# Patient Record
Sex: FEMALE | Race: WHITE | NOT HISPANIC OR LATINO | Employment: FULL TIME | URBAN - METROPOLITAN AREA
[De-identification: names, ages, dates, MRNs, and addresses within clinical notes are randomized per-mention and may not be internally consistent; named-entity substitution may affect disease eponyms.]

---

## 2021-05-06 ENCOUNTER — PATIENT MESSAGE (OUTPATIENT)
Dept: RESEARCH | Facility: HOSPITAL | Age: 56
End: 2021-05-06

## 2022-09-14 ENCOUNTER — OFFICE VISIT (OUTPATIENT)
Dept: PODIATRY | Facility: CLINIC | Age: 57
End: 2022-09-14
Payer: COMMERCIAL

## 2022-09-14 VITALS — BODY MASS INDEX: 24.66 KG/M2 | WEIGHT: 134 LBS | HEIGHT: 62 IN

## 2022-09-14 DIAGNOSIS — M62.461 GASTROCNEMIUS EQUINUS, RIGHT: ICD-10-CM

## 2022-09-14 DIAGNOSIS — M72.2 PLANTAR FASCIITIS: Primary | ICD-10-CM

## 2022-09-14 PROCEDURE — 1159F MED LIST DOCD IN RCRD: CPT | Mod: CPTII,S$GLB,, | Performed by: PODIATRIST

## 2022-09-14 PROCEDURE — 1159F PR MEDICATION LIST DOCUMENTED IN MEDICAL RECORD: ICD-10-PCS | Mod: CPTII,S$GLB,, | Performed by: PODIATRIST

## 2022-09-14 PROCEDURE — 99204 OFFICE O/P NEW MOD 45 MIN: CPT | Mod: S$GLB,,, | Performed by: PODIATRIST

## 2022-09-14 PROCEDURE — 99999 PR PBB SHADOW E&M-EST. PATIENT-LVL III: CPT | Mod: PBBFAC,,, | Performed by: PODIATRIST

## 2022-09-14 PROCEDURE — 3008F PR BODY MASS INDEX (BMI) DOCUMENTED: ICD-10-PCS | Mod: CPTII,S$GLB,, | Performed by: PODIATRIST

## 2022-09-14 PROCEDURE — 99204 PR OFFICE/OUTPT VISIT, NEW, LEVL IV, 45-59 MIN: ICD-10-PCS | Mod: S$GLB,,, | Performed by: PODIATRIST

## 2022-09-14 PROCEDURE — 3008F BODY MASS INDEX DOCD: CPT | Mod: CPTII,S$GLB,, | Performed by: PODIATRIST

## 2022-09-14 PROCEDURE — 99999 PR PBB SHADOW E&M-EST. PATIENT-LVL III: ICD-10-PCS | Mod: PBBFAC,,, | Performed by: PODIATRIST

## 2022-09-14 NOTE — PATIENT INSTRUCTIONS
- Perform stretching exercises, see handout for details, to address tightness of calf muscles.      - Recommend wearing supportive shoes only.  Avoid barefoot walking, flip flops, and Crocs, as this will exacerbate current symptoms.      - Recommend icing the affected heel a minimum of 20 minutes daily.    - Recommend taking either Tylenol or Ibuprofen to address pain.  The appropriate medication was recommended with today's visit.      - Avoid high impact activities such as squatting, stooping, and running as these activities will exacerbate symptoms.      - May consider applying a topical analgesic (Voltaren cream also known as diclofenac) to help with pain symptoms.

## 2022-09-15 NOTE — PROGRESS NOTES
Subjective:      Patient ID: Kamini Dolan is a 57 y.o. female.    Chief Complaint: No chief complaint on file.    Kamini is a 57 y.o. female who presents to the clinic complaining of Rt. Heel pain that has been present for months.  Describes pain as sharp and rates as a 2/10 while at rest.  Symptoms are aggravated with weight bearing after periods of rest.  States symptoms were initially noted after moving her daughter from her third floor apartment with continuous ascending and descending the stairs.  Symptoms are now beginning to impede her activities of daily living.  Notes she would like to be active but is unable.  Inquires as to how this can be resolved.  Denies any additional pedal complaints.      Past Medical History:   Diagnosis Date    Acquired hypothyroidism 2016    Sciatica neuralgia 2016    Seasonal allergic rhinitis 2012    Sinusitis, chronic 2012       No past surgical history on file.    Family History   Problem Relation Age of Onset    Breast cancer Mother 61       Social History     Socioeconomic History    Marital status:    Tobacco Use    Smoking status: Former     Types: Cigarettes     Quit date: 1987     Years since quittin.8   Substance and Sexual Activity    Alcohol use: Yes     Alcohol/week: 0.0 standard drinks       Current Outpatient Medications   Medication Sig Dispense Refill    ACZONE 5 % topical gel       azelastine (ASTELIN) 137 mcg (0.1 %) nasal spray 1 SPRAY (137 MCG TOTAL) BY NASAL ROUTE 2 (TWO) TIMES DAILY. 30 mL 5    fluticasone (FLONASE) 50 mcg/actuation nasal spray 2 SPRAYS IN EACH NOSTRIL EVERY DAY 1 Bottle 5    levocetirizine (XYZAL) 5 MG tablet Take 1 tablet (5 mg total) by mouth once daily. 30 tablet 11    montelukast (SINGULAIR) 10 mg tablet Take 1 tablet (10 mg total) by mouth once daily. 30 tablet 5    azithromycin (Z-MARQUIS) 250 MG tablet Take 1 tablet (250 mg total) by mouth once daily. Take first 2 tablets together,  then 1 every day until finished. 6 tablet 0    ibuprofen (ADVIL,MOTRIN) 600 MG tablet Take 1 tablet (600 mg total) by mouth every 6 (six) hours as needed for Pain. 20 tablet 0    levothyroxine (SYNTHROID) 25 MCG tablet Take 1 - 2 po QD as directed 60 tablet 5    LOSEASONIQUE 0.10 mg-20 mcg (84)/10 mcg (7) 3MPk       meloxicam (MOBIC) 15 MG tablet Take 1 tablet (15 mg total) by mouth daily as needed. 30 tablet 5    naproxen (NAPROSYN) 500 MG tablet Take 1 tablet (500 mg total) by mouth 2 (two) times daily with meals. PRN pain, take with food 14 tablet 0     No current facility-administered medications for this visit.       Review of patient's allergies indicates:   Allergen Reactions    Doxycycline Nausea Only    Morphine Nausea And Vomiting    Penicillins       Review of Systems   Constitutional: Negative for chills and fever.   Cardiovascular:  Negative for claudication and leg swelling.   Skin:  Negative for color change and nail changes.   Musculoskeletal:  Positive for myalgias. Negative for joint pain, joint swelling, muscle cramps and muscle weakness.   Gastrointestinal:  Negative for nausea and vomiting.   Neurological:  Negative for numbness and paresthesias.   Psychiatric/Behavioral:  Negative for altered mental status.          Objective:      Physical Exam  Constitutional:       Appearance: Normal appearance. She is not ill-appearing.   Cardiovascular:      Pulses:           Dorsalis pedis pulses are 2+ on the right side and 2+ on the left side.        Posterior tibial pulses are 2+ on the right side and 2+ on the left side.      Comments: CFT is < 3 seconds bilateral.  Pedal hair growth is present bilateral.  No lower extremity edema noted bilateral.  Toes are warm to touch bilateral.    Musculoskeletal:         General: Tenderness present. No swelling or signs of injury.      Right lower leg: No edema.      Left lower leg: No edema.      Comments: Muscle strength 5/5 in all muscle groups bilateral.  No  tenderness nor crepitation with ROM of foot/ankle joints bilateral.   Pain with palpation to the Rt. Medial calcaneal tubercle.  Rt. Sided gastrocnemius equinus.   Skin:     General: Skin is warm and dry.      Capillary Refill: Capillary refill takes 2 to 3 seconds.      Findings: No bruising, ecchymosis, erythema, signs of injury, laceration, lesion, petechiae, rash or wound.      Comments: Pedal skin has normal turgor, temperature, and texture bilateral.  Toenails x 10 appear normotrophic. Examination of the skin reveals no evidence of significant maceration, rashes, open lesions, suspicious appearing nevi or other concerning lesions.       Neurological:      General: No focal deficit present.      Mental Status: She is alert.      Sensory: No sensory deficit.      Motor: No weakness or atrophy.      Comments: Light touch is intact bilateral.             Assessment:       Encounter Diagnoses   Name Primary?    Plantar fasciitis - Right Foot Yes    Gastrocnemius equinus, right          Plan:       Diagnoses and all orders for this visit:    Plantar fasciitis - Right Foot  -     Ambulatory referral/consult to Physical/Occupational Therapy; Future    Gastrocnemius equinus, right  -     Ambulatory referral/consult to Physical/Occupational Therapy; Future      I counseled the patient on her conditions, their implications and medical management.       - Discussed performing stretching exercises to address bilateral equinus.     - Recommend wearing supportive shoes only.  Discussed avoidance of barefoot walking, flip flops, and Crocs, as this will exacerbate current symptoms.      - Recommend icing the affected heel a minimum of 20 minutes daily.     - Recommend taking a nsaid to address pain/inflammation.    - Discussed avoidance of high impact activities such as squatting, stooping, and running as these activities will exacerbate symptoms.       - May consider applying a topical analgesic (Voltaren cream) to help with  pain symptoms.       - RTC prn or sooner if symptoms fail to resolve within 6 weeks.  Will consider corticosteroid injection at that time.     Phil Gaspar DPM

## 2022-10-05 ENCOUNTER — PATIENT MESSAGE (OUTPATIENT)
Dept: PODIATRY | Facility: CLINIC | Age: 57
End: 2022-10-05
Payer: COMMERCIAL

## 2022-10-07 ENCOUNTER — PATIENT MESSAGE (OUTPATIENT)
Dept: PODIATRY | Facility: CLINIC | Age: 57
End: 2022-10-07
Payer: COMMERCIAL

## 2022-10-14 ENCOUNTER — PATIENT MESSAGE (OUTPATIENT)
Dept: PODIATRY | Facility: CLINIC | Age: 57
End: 2022-10-14
Payer: COMMERCIAL

## 2022-10-17 ENCOUNTER — OFFICE VISIT (OUTPATIENT)
Dept: PODIATRY | Facility: CLINIC | Age: 57
End: 2022-10-17
Payer: COMMERCIAL

## 2022-10-17 DIAGNOSIS — M72.2 PLANTAR FASCIITIS: Primary | ICD-10-CM

## 2022-10-17 DIAGNOSIS — M62.461 GASTROCNEMIUS EQUINUS, RIGHT: ICD-10-CM

## 2022-10-17 PROCEDURE — 99499 NO LOS: ICD-10-PCS | Mod: S$GLB,,, | Performed by: PODIATRIST

## 2022-10-17 PROCEDURE — 99999 PR PBB SHADOW E&M-EST. PATIENT-LVL II: ICD-10-PCS | Mod: PBBFAC,,, | Performed by: PODIATRIST

## 2022-10-17 PROCEDURE — 20550 PR INJECT TENDON SHEATH/LIGAMENT: ICD-10-PCS | Mod: RT,S$GLB,, | Performed by: PODIATRIST

## 2022-10-17 PROCEDURE — 20550 NJX 1 TENDON SHEATH/LIGAMENT: CPT | Mod: RT,S$GLB,, | Performed by: PODIATRIST

## 2022-10-17 PROCEDURE — 99499 UNLISTED E&M SERVICE: CPT | Mod: S$GLB,,, | Performed by: PODIATRIST

## 2022-10-17 PROCEDURE — 1159F MED LIST DOCD IN RCRD: CPT | Mod: CPTII,S$GLB,, | Performed by: PODIATRIST

## 2022-10-17 PROCEDURE — 1159F PR MEDICATION LIST DOCUMENTED IN MEDICAL RECORD: ICD-10-PCS | Mod: CPTII,S$GLB,, | Performed by: PODIATRIST

## 2022-10-17 PROCEDURE — 99999 PR PBB SHADOW E&M-EST. PATIENT-LVL II: CPT | Mod: PBBFAC,,, | Performed by: PODIATRIST

## 2022-10-17 RX ORDER — DEXAMETHASONE SODIUM PHOSPHATE 4 MG/ML
2 INJECTION, SOLUTION INTRA-ARTICULAR; INTRALESIONAL; INTRAMUSCULAR; INTRAVENOUS; SOFT TISSUE
Status: COMPLETED | OUTPATIENT
Start: 2022-10-17 | End: 2022-10-17

## 2022-10-17 RX ORDER — LIDOCAINE HYDROCHLORIDE 10 MG/ML
1 INJECTION INFILTRATION; PERINEURAL
Status: COMPLETED | OUTPATIENT
Start: 2022-10-17 | End: 2022-10-17

## 2022-10-17 RX ORDER — TRIAMCINOLONE ACETONIDE 40 MG/ML
20 INJECTION, SUSPENSION INTRA-ARTICULAR; INTRAMUSCULAR ONCE
Status: COMPLETED | OUTPATIENT
Start: 2022-10-17 | End: 2022-10-17

## 2022-10-17 RX ADMIN — TRIAMCINOLONE ACETONIDE 20 MG: 40 INJECTION, SUSPENSION INTRA-ARTICULAR; INTRAMUSCULAR at 02:10

## 2022-10-17 RX ADMIN — LIDOCAINE HYDROCHLORIDE 1 ML: 10 INJECTION INFILTRATION; PERINEURAL at 02:10

## 2022-10-17 RX ADMIN — DEXAMETHASONE SODIUM PHOSPHATE 2 MG: 4 INJECTION, SOLUTION INTRA-ARTICULAR; INTRALESIONAL; INTRAMUSCULAR; INTRAVENOUS; SOFT TISSUE at 02:10

## 2022-10-17 NOTE — PROGRESS NOTES
Subjective:      Patient ID: Kamini Dolan is a 57 y.o. female.    Chief Complaint: Heel Pain (rt)    Patient presents to clinic for a follow up regarding Rt. Sided plantar fasciitis.  Describes pain in the heel as sharp and rates currently as a 3/10.  Symptoms are aggravated with prolonged weight bearing activity and alleviated partially with rest.  She has been stretching and wearing supportive shoe gear, as instructed, with moderate improvement noted in symptoms.  Requests an injection with today's exam.  Denies any additional pedal complaints.      Past Medical History:   Diagnosis Date    Acquired hypothyroidism 2016    Sciatica neuralgia 2016    Seasonal allergic rhinitis 2012    Sinusitis, chronic 2012       No past surgical history on file.    Family History   Problem Relation Age of Onset    Breast cancer Mother 61       Social History     Socioeconomic History    Marital status:    Tobacco Use    Smoking status: Former     Types: Cigarettes     Quit date: 1987     Years since quittin.9   Substance and Sexual Activity    Alcohol use: Yes     Alcohol/week: 0.0 standard drinks       Current Outpatient Medications   Medication Sig Dispense Refill    ACZONE 5 % topical gel       azelastine (ASTELIN) 137 mcg (0.1 %) nasal spray 1 SPRAY (137 MCG TOTAL) BY NASAL ROUTE 2 (TWO) TIMES DAILY. 30 mL 5    azithromycin (Z-MARQUIS) 250 MG tablet Take 1 tablet (250 mg total) by mouth once daily. Take first 2 tablets together, then 1 every day until finished. 6 tablet 0    fluticasone (FLONASE) 50 mcg/actuation nasal spray 2 SPRAYS IN EACH NOSTRIL EVERY DAY 1 Bottle 5    ibuprofen (ADVIL,MOTRIN) 600 MG tablet Take 1 tablet (600 mg total) by mouth every 6 (six) hours as needed for Pain. 20 tablet 0    levocetirizine (XYZAL) 5 MG tablet Take 1 tablet (5 mg total) by mouth once daily. 30 tablet 11    levothyroxine (SYNTHROID) 25 MCG tablet Take 1 - 2 po QD as directed 60 tablet 5     LOSEASONIQUE 0.10 mg-20 mcg (84)/10 mcg (7) 3MPk       meloxicam (MOBIC) 15 MG tablet Take 1 tablet (15 mg total) by mouth daily as needed. 30 tablet 5    montelukast (SINGULAIR) 10 mg tablet Take 1 tablet (10 mg total) by mouth once daily. 30 tablet 5    naproxen (NAPROSYN) 500 MG tablet Take 1 tablet (500 mg total) by mouth 2 (two) times daily with meals. PRN pain, take with food 14 tablet 0     Current Facility-Administered Medications   Medication Dose Route Frequency Provider Last Rate Last Admin    dexAMETHasone injection 2 mg  2 mg Other 1 time in Clinic/HOD Phil Gaspar DPM        LIDOcaine HCL 10 mg/ml (1%) injection 1 mL  1 mL Other 1 time in Clinic/HOD Phil Gaspar DPM        triamcinolone acetonide injection 20 mg  20 mg Other Once Phil Gaspar DPM           Review of patient's allergies indicates:   Allergen Reactions    Doxycycline Nausea Only    Morphine Nausea And Vomiting    Penicillins       Review of Systems   Constitutional: Negative for chills and fever.   Cardiovascular:  Negative for claudication and leg swelling.   Skin:  Negative for color change and nail changes.   Musculoskeletal:  Positive for myalgias. Negative for joint pain, joint swelling, muscle cramps and muscle weakness.   Gastrointestinal:  Negative for nausea and vomiting.   Neurological:  Negative for numbness and paresthesias.   Psychiatric/Behavioral:  Negative for altered mental status.          Objective:      Physical Exam  Constitutional:       Appearance: Normal appearance. She is not ill-appearing.   Cardiovascular:      Pulses:           Dorsalis pedis pulses are 2+ on the right side and 2+ on the left side.        Posterior tibial pulses are 2+ on the right side and 2+ on the left side.      Comments: CFT is < 3 seconds bilateral.  Pedal hair growth is present bilateral.  No lower extremity edema noted bilateral.  Toes are warm to touch bilateral.    Musculoskeletal:         General: Tenderness present. No  swelling or signs of injury.      Right lower leg: No edema.      Left lower leg: No edema.      Comments: Muscle strength 5/5 in all muscle groups bilateral.  No tenderness nor crepitation with ROM of foot/ankle joints bilateral.   Pain with palpation to the Rt. Medial calcaneal tubercle.  Rt. Sided gastrocnemius equinus.   Skin:     General: Skin is warm and dry.      Capillary Refill: Capillary refill takes 2 to 3 seconds.      Findings: No bruising, ecchymosis, erythema, signs of injury, laceration, lesion, petechiae, rash or wound.      Comments: Pedal skin has normal turgor, temperature, and texture bilateral.  Toenails x 10 appear normotrophic. Examination of the skin reveals no evidence of significant maceration, rashes, open lesions, suspicious appearing nevi or other concerning lesions.       Neurological:      General: No focal deficit present.      Mental Status: She is alert.      Sensory: No sensory deficit.      Motor: No weakness or atrophy.      Comments: Light touch is intact bilateral.             Assessment:       Encounter Diagnoses   Name Primary?    Plantar fasciitis - Right Foot Yes    Gastrocnemius equinus, right          Plan:       Kamini was seen today for heel pain.    Diagnoses and all orders for this visit:    Plantar fasciitis - Right Foot  -     LIDOcaine HCL 10 mg/ml (1%) injection 1 mL  -     dexAMETHasone injection 2 mg  -     triamcinolone acetonide injection 20 mg    Gastrocnemius equinus, right    I counseled the patient on her conditions, their implications and medical management.    - After sterilizing the area with an alcohol prep and applying ethyl chloride, the affected area of the Rt. Medial heel was injected as per MAR.  The patient tolerated the injection well, with minimal blood loss noted from the injection site.  The injection site was then covered with a bandage.  Patient tolerated this quite well.         - To continue with the current regimen of stretching,  wearing appropriate shoe gear, and avoidance of high impact activities.       - RTC prn or sooner if symptoms fail to improve.  Will consider PT going forward.     Phil Gaspar DPM

## 2022-10-18 ENCOUNTER — PATIENT MESSAGE (OUTPATIENT)
Dept: PODIATRY | Facility: CLINIC | Age: 57
End: 2022-10-18
Payer: COMMERCIAL

## 2022-10-18 ENCOUNTER — TELEPHONE (OUTPATIENT)
Dept: FAMILY MEDICINE | Facility: CLINIC | Age: 57
End: 2022-10-18
Payer: COMMERCIAL

## 2022-10-18 NOTE — TELEPHONE ENCOUNTER
----- Message from Verónica Barriga sent at 10/18/2022  1:52 PM CDT -----  Type: Patient Call Back         Who called: Pt  Spouse          What is the request in detail: Pt called in regarding needs orders for a PCR test done with a QR code . Pt doesn't have a PCP Dr with [facility] . Can she still receive orders ?         Can the clinic reply by [facility]?no          Would the patient rather a call back or a response via [facility]? Call back          Best call back number:[phone]  or [phone] ROSALBA GREER Pt spouse          Additional Information:           Thank You

## 2022-10-23 ENCOUNTER — LAB VISIT (OUTPATIENT)
Dept: FAMILY MEDICINE | Facility: CLINIC | Age: 57
End: 2022-10-23
Payer: COMMERCIAL

## 2022-10-23 DIAGNOSIS — Z11.52 ENCOUNTER FOR SCREENING FOR SEVERE ACUTE RESPIRATORY SYNDROME CORONAVIRUS 2 (SARS-COV-2) INFECTION: Primary | ICD-10-CM

## 2022-10-23 LAB — SARS-COV-2 RNA RESP QL NAA+PROBE: NOT DETECTED

## 2022-10-23 PROCEDURE — U0003 INFECTIOUS AGENT DETECTION BY NUCLEIC ACID (DNA OR RNA); SEVERE ACUTE RESPIRATORY SYNDROME CORONAVIRUS 2 (SARS-COV-2) (CORONAVIRUS DISEASE [COVID-19]), AMPLIFIED PROBE TECHNIQUE, MAKING USE OF HIGH THROUGHPUT TECHNOLOGIES AS DESCRIBED BY CMS-2020-01-R: HCPCS | Performed by: FAMILY MEDICINE

## 2022-10-23 PROCEDURE — U0005 INFEC AGEN DETEC AMPLI PROBE: HCPCS | Performed by: FAMILY MEDICINE

## 2023-03-27 ENCOUNTER — DOCUMENTATION ONLY (OUTPATIENT)
Dept: ADMINISTRATIVE | Facility: OTHER | Age: 58
End: 2023-03-27
Payer: COMMERCIAL

## 2023-04-11 NOTE — PROGRESS NOTES
RADIATION ONCOLOGY CONSULTATION  SAMIRA BIRD [facility]      NAME: Kamini Dolan    : 1965 SEX: F MR#: [id number]   DIAGNOSIS: Right breast ductal carcinoma in situ   REFERRING PHYSICIAN: Lakeisha Kemp M.D.   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  The patient is being seen in consultation for consideration of radiotherapy at the request of Dr. Kemp.    HISTORY OF PRESENT ILLNESS:  The patient reports that for approximately the past five years she has undergone both annual MRIs and mammograms secondary to an increased Tyrer-Cuzick score of 20% to 40%.  Bilateral diagnostic mammograms on 2022, showed no evidence of malignancy.  Bilateral breast ultrasound on the same date showed a few benign-appearing anechoic cysts up to 3 mm in size.  There was a 7 mm intramammary lymph node at the 3 o'clock position without change, with the imaging read as BI-RADS 2.     On 2022, the patient underwent a bilateral breast MRI which showed a new approximate 1 cm segmentally-oriented plaque-like focus of clumped enhancement at the 11 to 12 o'clock position mid right breast with subadjacent glandular tissue.  There was no axillary or internal mammary lymphadenopathy, with the imaging read as BI-RADS 0.  On 2022, she underwent a follow up right diagnostic mammogram and ultrasound with no mammographic or ultrasound correlate noted on the imaging.  There was normal-appearing parenchyma with a few scattered benign-appearing calcifications without change seen.     On 2023, she underwent MRI-guided right breast core needle biopsy at the 11 to 12 o'clock  position.  Pathology demonstrated grade II ductal carcinoma in situ with the size not specified on the pathology report.  The specimen size was inadequate to perform estrogen receptor or progesterone receptor testing.  She was seen by Dr. Morris on 01/24/2023, who discussed the diagnosis and treatment options with plans made for breast conservation therapy at that time.  Genetic testing was ordered which was reportedly negative.     On 02/10/2023, she underwent a right breast AALIYAH  lumpectomy and sentinel lymph node biopsy by Dr. Morris.  The primary specimen measured 5.6 x 3.5 cm and demonstrated 4 x 4 mm of grade II ductal carcinoma in situ, cribriform pattern, without necrosis.  The margins were greater than 1 cm.  There were 0/1 positive lymph nodes.  The tumor was estrogen receptor negative and progesterone receptor negative.  She was staged as pTispN0.     She saw Dr. Morris on 02/28/2023, who felt that the patient was healing well postoperatively and noted possible consideration of omitting radiotherapy.  The patient was referred to Medical Oncology and Radiation Oncology.  She was seen by Dr. Kemp on 03/10/2023, at which time she discussed endocrine therapy, including chemoprevention with consideration of maybe tamoxifen.  The patient was referred to Radiation Oncology.    REVIEW OF SYSTEMS:  The patient denies any breast complaints, including masses, rashes, nipple discharge or axillary adenopathy.  She does note that there is a tissue defect superior to the nipple with the nipple elevated postoperatively, compared to the left side.  Denies any high fevers, shaking chills, drenching night sweats or recent weight loss.  She does report intermittent hot flashes, as well as foot, hip and back pain which is chronic in nature.     She denies any recent changes in vision, hearing or swallowing, shortness of breath at rest, dyspnea on exertion, cough, chest pain, abdominal pain, nausea, vomiting,  constipation, diarrhea, bright-red blood per rectum or urinary symptoms.  She denies any focal numbness, tingling, weakness, severe headaches, diplopia or ataxia.  All other systems reviewed and negative.    PAST MEDICAL HISTORY:  Ductal carcinoma in situ as above, hypothyroidism, seasonal allergies, plantar fasciitis, question of adrenal adenoma.    PAST SURGICAL HISTORY:  Status post right lumpectomy and sentinel lymph node biopsy as above.    PAST GYNECOLOGIC HISTORY:   with one ectopic pregnancy.  Menarche at the age of 12.  First live birth at the age of 36.  She  for greater than one year.  Menopause around the age of 55.  Oral contraceptive pill use for greater than 25 years.  Hormone replacement therapy, none.    PAST RADIATION THERAPY:  None.    MEDICATIONS:  Probiotic, multiple vitamin, flaxseed oil, famotidine, montelukast, azelastine, levocetirizine, multivitamin, melatonin, celecoxib.    ALLERGIES:  Morphine and Valium both cause nausea.    FAMILY HISTORY:  Mother with breast cancer diagnosed at the age of 62.  Maternal first cousin with breast cancer diagnosed at the age of 63.  No family history of ovarian malignancy.    SOCIAL HISTORY:  The patient does not smoke.  She reports drinking wine daily with dinner and denies illicit drug use.  She lives in [place], is  and has one daughter.  She worked in the past as an .        PHYSICAL EXAMINATION:  VITAL SIGNS:  Blood pressure 184/103, heart rate 91, temperature 98.3, height 5 feet 2 inches, weight 135 pounds, oxygen saturation 100% on room air.  ECOG score of 0.   GENERAL:  The patient is a pleasant well-nourished, well-developed  female in no acute distress.   HEENT:  Normocephalic, atraumatic.  Extraocular muscles intact.  Pupils equally round and reactive to light.  Sclerae anicteric.  Oral cavity and oropharynx are clear without erythema, exudate, masses or ulcerations.   NECK:  Supple without lymphadenopathy  or thyromegaly.   HEART:  Regular rate and rhythm.  Normal S1, S2.  No murmurs, gallops or rubs.     LUNGS:  Clear to auscultation bilaterally.  No wheezes, rhonchi or rales.   BACK:  No spinal or costovertebral angle tenderness.   ABDOMEN:  Soft, nontender and nondistended.  No masses or hepatosplenomegaly.   EXTREMITIES:  Warm and well perfused.  No clubbing, cyanosis or edema.   NEUROLOGIC:  Cranial nerves two through 12 grossly intact.  Motor and sensory intact bilaterally.  Finger-nose-finger without dysmetria with bilateral intention tremor.  2+ patellar deep tendon reflexes bilaterally.  No clonus.   BREASTS:  The left breast is soft and nontender without palpable masses or axillary lymphadenopathy.  The right breast demonstrates a well-healed 3 cm axillary scar and well-healed 2.5 cm horizontal lumpectomy scar at the 12 o'clock position with underlying postoperative induration.  There are no suspicious masses, rashes, nipple discharge, peau d'orange, skin retraction or axillary lymphadenopathy.    LABORATORIES:  None available.    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     Based upon this patient's history and presentation, I believe that she may be an appropriate candidate for external beam radiation therapy.  We discussed her presentation with early stage disease, namely pure DCIS, with excellent overall expected prognosis.  We reviewed her primary surgical treatment options being mastectomy versus breast conservation therapy consisting of lumpectomy followed by radiation with the overall survival being equivalent with either treatment modality.  The patient has undergone a lumpectomy with 4 mm of residual DCIS with wide surgical margins and a  negative sentinel lymph node biopsy, from which she appears to be healing well.     With regard to systemic therapy, the patient has met with Dr. Kemp of Medical Oncology.  She understands that there is no role for cytotoxic chemotherapy in the setting of in situ disease.  Her tumor stained negative for both estrogen receptors and progesterone receptors, and therefore there is no role for endocrine therapy to reduce the risk of recurrence of the excised tumor; however, there may be a role for possible chemoprevention with maybe tamoxifen to reduce the risk of future disease, which I encouraged her to further discuss with Dr. Kemp.     I explained to Ms. Dolan that the role of external beam radiation therapy is to reduce the risk of local and/or regional recurrence by approximately one-half to two-thirds in the setting of DCIS.  I would estimate her risk of local recurrence without radiation to be approximately 20%, although possibly as low as 15% given the overall favorable tumor characteristics of the patient's disease with the goal of treatment to reduce this risk to the under 5% to 7% to 8% range.  I do feel that she would be an appropriate candidate for a shortened course of hypofractionated external beam radiation therapy based upon recent randomized data from the [place] and [place] [facility] demonstrating equivalent treatment efficacy.  There is no role for treatment of the supraclavicular fossa in the absence of positive axillary lymph nodes, nor do I feel that an additional radiation boost is indicated to the tumor bed given her postmenopausal status, presentation of grade II disease and wide surgical margins.     We discussed the possibility of omitting radiation following lumpectomy, although this is typically not considered to be the standard of care as radiation has been demonstrated to decrease local recurrence risk in all patient populations.  She is likely at lower risk for local recurrence without radiation  given the minimal amount of in situ disease with wide surgical margins and it would therefore not be unreasonable to consider omitting radiation if she is opposed to it; however, I am concerned that given her ER and TX negative disease she will not benefit from endocrine therapy further reducing her local recurrence risk.      We discussed both the NCCN guidelines relating to postoperative radiation in the setting of DCIS, as well as RTOG 9804 in which local failure rates were low with observation following surgery, but significantly decreased with the addition of radiotherapy.  Ultimately, her decision with regard to radiation will depend upon whether she is more averse to postoperative radiotherapy, which I would anticipate her to tolerate well, or more averse to a slightly higher risk of recurrence without the addition of radiation.     The risks, benefits, side effects, alternatives to, indications for and mechanisms of external beam radiation therapy were explained in full to the patient.  She asked multiple appropriate questions, all of which were answered to her apparent satisfaction.  This conversation included a discussion of possible short-term side effects, including but not limited to dermatitis with skin tenderness, erythema, pruritus, possible desquamation, local hair loss and fatigue.      We also discussed the possible long-term side effects, including but not limited to residual hyperpigmentation, telangiectasias, the change in the shape, size and/or consistency of the breast, very low risk of ipsilateral rib fracture or trauma, very low risk of pneumonitis and/or pulmonary fibrosis, and less than 0.5% risk of secondary tumor formation over decades.  She was not at significantly increased risk for cardiac sequelae as this is a right-sided tumor.     At the conclusion of the consultation, Ms. Dolan indicated that she wished to further consider her options prior to making a final treatment decision,  which I encouraged her to do.  I provided her with a copy of my business card, and encouraged her to contact me should she have any questions and after she has made her final decision.  Should she opt for postoperative radiation, plans will be made for the patient to undergo CT-guided simulation in the supine treatment position with the use of a tilt board and Vac-Cathy for custom immobilization.  After a customized treatment plan has been designed, she would undergo verification films and initiate a course of hypofractionated forward-planned 3D conformal radiotherapy to the right breast.     Thank you very much, Dr. Kemp, for this consultation and the opportunity to participate in the care of this patient.  Please do not hesitate to contact me should you any questions, concerns and/or require additional information.      ADDENDUM:  Ms. Dolan has indicated that she would like to proceed with postoperative radiotherapy, and she has therefore been scheduled for CT-guided simulation with the plan to begin hypofractionated radiotherapy after her upcoming birthday, per the patient's request.        SANTA Trujillo MD

## 2023-06-15 ENCOUNTER — TELEPHONE (OUTPATIENT)
Dept: OBSTETRICS AND GYNECOLOGY | Facility: CLINIC | Age: 58
End: 2023-06-15
Payer: COMMERCIAL

## 2023-06-15 NOTE — TELEPHONE ENCOUNTER
----- Message from Rex Palmer sent at 6/15/2023 11:32 AM CDT -----  Contact: self  Type: Needs Medical Advice  Who Called: Patient   Best Call Back Number: [phone]  Additional Information: Pt states she will bautista to get her [facility] records sent to Dr. Lyon. Plz call and confirm how this can be done. Thanks

## 2023-06-16 ENCOUNTER — PATIENT MESSAGE (OUTPATIENT)
Dept: OBSTETRICS AND GYNECOLOGY | Facility: CLINIC | Age: 58
End: 2023-06-16
Payer: COMMERCIAL

## 2023-06-26 ENCOUNTER — PATIENT MESSAGE (OUTPATIENT)
Dept: OBSTETRICS AND GYNECOLOGY | Facility: CLINIC | Age: 58
End: 2023-06-26
Payer: COMMERCIAL

## 2023-08-15 ENCOUNTER — OFFICE VISIT (OUTPATIENT)
Dept: OBSTETRICS AND GYNECOLOGY | Facility: CLINIC | Age: 58
End: 2023-08-15
Payer: COMMERCIAL

## 2023-08-15 VITALS — BODY MASS INDEX: 26.13 KG/M2 | WEIGHT: 142 LBS | HEIGHT: 62 IN

## 2023-08-15 DIAGNOSIS — Z79.810 PROPHYLACTIC USE OF TAMOXIFEN: ICD-10-CM

## 2023-08-15 DIAGNOSIS — Z01.419 GYNECOLOGIC EXAM NORMAL: Primary | ICD-10-CM

## 2023-08-15 DIAGNOSIS — N32.81 OAB (OVERACTIVE BLADDER): ICD-10-CM

## 2023-08-15 DIAGNOSIS — D05.11 DUCTAL CARCINOMA IN SITU (DCIS) OF RIGHT BREAST: ICD-10-CM

## 2023-08-15 PROCEDURE — 99999 PR PBB SHADOW E&M-EST. PATIENT-LVL III: CPT | Mod: PBBFAC,,, | Performed by: OBSTETRICS & GYNECOLOGY

## 2023-08-15 PROCEDURE — 99386 PREV VISIT NEW AGE 40-64: CPT | Mod: S$GLB,,, | Performed by: OBSTETRICS & GYNECOLOGY

## 2023-08-15 PROCEDURE — 99999 PR PBB SHADOW E&M-EST. PATIENT-LVL III: ICD-10-PCS | Mod: PBBFAC,,, | Performed by: OBSTETRICS & GYNECOLOGY

## 2023-08-15 PROCEDURE — 88175 CYTOPATH C/V AUTO FLUID REDO: CPT | Performed by: OBSTETRICS & GYNECOLOGY

## 2023-08-15 PROCEDURE — 3008F PR BODY MASS INDEX (BMI) DOCUMENTED: ICD-10-PCS | Mod: CPTII,S$GLB,, | Performed by: OBSTETRICS & GYNECOLOGY

## 2023-08-15 PROCEDURE — 99386 PR PREVENTIVE VISIT,NEW,40-64: ICD-10-PCS | Mod: S$GLB,,, | Performed by: OBSTETRICS & GYNECOLOGY

## 2023-08-15 PROCEDURE — 87624 HPV HI-RISK TYP POOLED RSLT: CPT | Performed by: OBSTETRICS & GYNECOLOGY

## 2023-08-15 PROCEDURE — 3008F BODY MASS INDEX DOCD: CPT | Mod: CPTII,S$GLB,, | Performed by: OBSTETRICS & GYNECOLOGY

## 2023-08-15 RX ORDER — AMOXICILLIN 500 MG
CAPSULE ORAL DAILY
COMMUNITY

## 2023-08-15 RX ORDER — TRETINOIN 0.1 MG/G
GEL TOPICAL
COMMUNITY
Start: 2015-08-12

## 2023-08-15 RX ORDER — PROPYLENE GLYCOL 0.06 MG/ML
SOLUTION/ DROPS OPHTHALMIC
COMMUNITY
Start: 2020-08-12

## 2023-08-15 RX ORDER — CITRULL/ARGIN/PINE XT/ROSE HIP 400-400 MG
TABLET ORAL
COMMUNITY
Start: 2023-05-15

## 2023-08-15 RX ORDER — TAMOXIFEN CITRATE 10 MG/1
5 TABLET ORAL
COMMUNITY
Start: 2023-07-23

## 2023-08-15 RX ORDER — OLOPATADINE HYDROCHLORIDE 1 MG/ML
SOLUTION/ DROPS OPHTHALMIC
COMMUNITY
Start: 2021-08-12

## 2023-08-15 RX ORDER — LEVOTHYROXINE SODIUM 75 UG/1
75 TABLET ORAL
COMMUNITY
Start: 2023-06-29

## 2023-08-15 RX ORDER — FAMOTIDINE 20 MG/1
20 TABLET, FILM COATED ORAL 2 TIMES DAILY
COMMUNITY
Start: 2023-03-18

## 2023-08-15 RX ORDER — LANOLIN ALCOHOL/MO/W.PET/CERES
1 CREAM (GRAM) TOPICAL
COMMUNITY

## 2023-08-15 RX ORDER — EPINEPHRINE 0.3 MG/.3ML
INJECTION SUBCUTANEOUS
COMMUNITY
Start: 2023-03-24

## 2023-08-15 RX ORDER — CELECOXIB 100 MG/1
CAPSULE ORAL
COMMUNITY
Start: 2020-08-12

## 2023-08-15 RX ORDER — CHOLECALCIFEROL (VITAMIN D3) 25 MCG
1000 TABLET ORAL DAILY
COMMUNITY

## 2023-08-15 RX ORDER — CYANOCOBALAMIN (VITAMIN B-12) 500 MCG
TABLET ORAL NIGHTLY
COMMUNITY

## 2023-08-15 NOTE — PROGRESS NOTES
Chief Complaint   Patient presents with    [facility]    Well Woman       History of Present Illness: Kamini Dolan is a 58 y.o. female that presents today 8/15/2023 with Patient's last menstrual period was 06/13/2020.  for well gyn visit.    Past Medical History:   Diagnosis Date    Acquired hypothyroidism 04/24/2016    Breast cancer in female 2023    DCIS    Lesion of adrenal gland     Sciatica neuralgia 04/24/2016    Seasonal allergic rhinitis 11/26/2012    Sinusitis, chronic 11/26/2012       Past Surgical History:   Procedure Laterality Date    LUMPECTOMY, BREAST Right 2023       Outpatient Medications Prior to Visit   Medication Sig Dispense Refill    AA/prot/lysine/methio/vit C/B6 (A/G PRO ORAL) Take by mouth.      ACZONE 5 % topical gel       azelastine (ASTELIN) 137 mcg (0.1 %) nasal spray 1 SPRAY (137 MCG TOTAL) BY NASAL ROUTE 2 (TWO) TIMES DAILY. 30 mL 5    calcium carb,gluc/mag ox,gluc (CALCIUM MAGNESIUM ORAL) Take by mouth.      celecoxib (CELEBREX) 100 MG capsule       COLLAGEN MISC by Misc.(Non-Drug; Combo Route) route.      enzymes,digestive (DIGESTIVE ENZYMES ORAL) Take by mouth.      EPINEPHrine (EPIPEN) 0.3 mg/0.3 mL AtIn Inject into the muscle.      famotidine (PEPCID) 20 MG tablet Take 20 mg by mouth 2 (two) times daily.      ferrous sulfate (FEOSOL) Tab tablet Take 1 tablet by mouth daily with breakfast.      fluticasone (FLONASE) 50 mcg/actuation nasal spray 2 SPRAYS IN EACH NOSTRIL EVERY DAY 1 Bottle 5    L.acid/B.animalis,bifidum/FOS (PROBIOTIC COMPLEX ORAL) Take by mouth.      levocetirizine (XYZAL) 5 MG tablet Take 1 tablet (5 mg total) by mouth once daily. 30 tablet 11    melatonin (MELATIN) Take by mouth every evening.      montelukast (SINGULAIR) 10 mg tablet Take 1 tablet (10 mg total) by mouth once daily. 30 tablet 5    multivitamin capsule Take 1 capsule by mouth once daily.      olopatadine (PATADAY TWICE DAILY RELIEF) 0.1 % ophthalmic solution       omega-3 fatty  acids/fish oil (FISH OIL-OMEGA-3 FATTY ACIDS) 300-1,000 mg capsule Take by mouth once daily.      PEPPERMINT OIL MISC by Misc.(Non-Drug; Combo Route) route.      pollens extract (RELIZEN) 160 mg Tab       propylene glycoL (SYSTANE COMPLETE) 0.6 % Drop       tamoxifen (NOLVADEX) 10 MG Tab Take 5 mg by mouth.      tretinoin (RETIN-A) 0.01 % gel       UNABLE TO FIND medication name: LivGall Cleanse      UNABLE TO FIND medication name: Viviscal      UNABLE TO FIND medication name: Stress support      UNITHROID 75 mcg tablet Take 75 mcg by mouth.      vitamin D (VITAMIN D3) 1000 units Tab Take 1,000 Units by mouth once daily.      azithromycin (Z-MARQUIS) 250 MG tablet Take 1 tablet (250 mg total) by mouth once daily. Take first 2 tablets together, then 1 every day until finished. 6 tablet 0    ibuprofen (ADVIL,MOTRIN) 600 MG tablet Take 1 tablet (600 mg total) by mouth every 6 (six) hours as needed for Pain. 20 tablet 0    levothyroxine (SYNTHROID) 25 MCG tablet Take 1 - 2 po QD as directed 60 tablet 5    LOSEASONIQUE 0.10 mg-20 mcg (84)/10 mcg (7) 3MPk       meloxicam (MOBIC) 15 MG tablet Take 1 tablet (15 mg total) by mouth daily as needed. 30 tablet 5    naproxen (NAPROSYN) 500 MG tablet Take 1 tablet (500 mg total) by mouth 2 (two) times daily with meals. PRN pain, take with food 14 tablet 0     No facility-administered medications prior to visit.       Review of patient's allergies indicates:   Allergen Reactions    Doxycycline Nausea Only    Morphine Nausea And Vomiting    Penicillins        Family History   Problem Relation Age of Onset    Diabetes Mother     Breast cancer Mother 61       Social History     Socioeconomic History    Marital status:    Tobacco Use    Smoking status: Former     Current packs/day: 0.00     Types: Cigarettes     Quit date: 1987     Years since quittin.7   Substance and Sexual Activity    Alcohol use: Yes     Alcohol/week: 0.0 standard drinks of alcohol       OB History  "   Para Term  AB Living   2 1 1   1     SAB IAB Ectopic Multiple Live Births       1   1      # Outcome Date GA Lbr Adolph/2nd Weight Sex Delivery Anes PTL Lv   2 Ectopic            1 Term      Vag-Spont         Obstetric Comments   MTX        Review of Symptoms:  GENERAL: Denies weight gain or weight loss. Feeling well overall.   SKIN: Denies rash or lesions.   HEAD: Denies head injury or headache.   NODES: Denies enlarged lymph nodes.   CHEST: Denies chest pain or shortness of breath.   CARDIOVASCULAR: Denies palpitations or left sided chest pain.   ABDOMEN: No abdominal pain, constipation, diarrhea, nausea, vomiting or rectal bleeding.   URINARY: No frequency, dysuria, hematuria, or burning on urination.  HEMATOLOGIC: No easy bruisability or excessive bleeding.   MUSCULOSKELETAL: Denies joint pain or swelling.     Ht 5' 2" (1.575 m)   Wt 64.4 kg (141 lb 15.6 oz)   LMP 2020   Physical Exam:  APPEARANCE: Well nourished, well developed, in no acute distress.  SKIN: Normal skin turgor, no lesions.  NECK: Neck symmetric without masses   RESPIRATORY: Normal respiratory effort with no retractions or use of accessory muscles  CARDIOVASCULAR: Peripheral vascular system with no swelling no varicosities and palpation of pulses normal  LYMPHATIC: No enlargements of the lymph nodes noted in the neck, axillae, or groin  ABDOMEN: Soft. No tenderness or masses. No hepatosplenomegaly. No hernias.  BREASTS: Symmetrical, no skin changes or visible lesions. No palpable masses, nipple discharge or adenopathy bilaterally.  PELVIC: Normal external female genitalia without lesions. Normal hair distribution. Adequate perineal body, normal urethral meatus. Urethra with no masses.  Bladder nontender. Vagina moist and well rugated without lesions or discharge. Cervix pink and without lesions. No significant cystocele or rectocele. Bimanual exam showed uterus normal size, shape, position, mobile and nontender. Adnexa " without masses or tenderness. Urethra and bladder normal.   EXTREMITIES: No clubbing cyanosis or edema.    ASSESSMENT/PLAN:  Gynecologic exam normal  -     Liquid-Based Pap Smear, Screening  -     HPV High Risk Genotypes, PCR    Prophylactic use of tamoxifen  Comments:  [id number]    Ductal carcinoma in situ (DCIS) of right breast  Comments:  2023    OAB (overactive bladder)    Other orders  -     Cancel: Mammo Digital Screening Bilat w/ Pete; Future; Expected date: 08/15/2023    She will do breast cancer screening with her oncologist with Dr. Kemp.       Patient was counseled today on Pelvic exams and Pap Smear guidelines.   We discussed STD screening if at high risk for a STD.  We discussed recommendation for breast cancer screening with mammogram every other year after the age of 40 and annually after the age of 50.    We discussed colon cancer screening when indicated.   Osteoporosis screening discussed when indicated.   She was advised to see her primary care physician for all other health maintenance.     FOLLOW-UP with me for next routine visit.

## 2023-08-22 LAB
FINAL PATHOLOGIC DIAGNOSIS: NORMAL
HPV HR 12 DNA SPEC QL NAA+PROBE: NEGATIVE
HPV16 AG SPEC QL: NEGATIVE
HPV18 DNA SPEC QL NAA+PROBE: NEGATIVE
Lab: NORMAL

## 2024-04-16 ENCOUNTER — PATIENT MESSAGE (OUTPATIENT)
Dept: PODIATRY | Facility: CLINIC | Age: 59
End: 2024-04-16
Payer: COMMERCIAL

## 2024-04-24 ENCOUNTER — PATIENT MESSAGE (OUTPATIENT)
Dept: PODIATRY | Facility: CLINIC | Age: 59
End: 2024-04-24
Payer: COMMERCIAL

## 2024-08-22 ENCOUNTER — OFFICE VISIT (OUTPATIENT)
Dept: OBSTETRICS AND GYNECOLOGY | Facility: CLINIC | Age: 59
End: 2024-08-22
Payer: COMMERCIAL

## 2024-08-22 VITALS
SYSTOLIC BLOOD PRESSURE: 130 MMHG | BODY MASS INDEX: 25.52 KG/M2 | DIASTOLIC BLOOD PRESSURE: 90 MMHG | HEIGHT: 62 IN | WEIGHT: 138.69 LBS

## 2024-08-22 DIAGNOSIS — D05.11 DUCTAL CARCINOMA IN SITU (DCIS) OF RIGHT BREAST: ICD-10-CM

## 2024-08-22 DIAGNOSIS — N76.3 CHRONIC VULVITIS: ICD-10-CM

## 2024-08-22 DIAGNOSIS — Z01.419 GYNECOLOGIC EXAM NORMAL: Primary | ICD-10-CM

## 2024-08-22 DIAGNOSIS — Z79.810 PROPHYLACTIC USE OF TAMOXIFEN: ICD-10-CM

## 2024-08-22 PROCEDURE — 99999 PR PBB SHADOW E&M-EST. PATIENT-LVL V: CPT | Mod: PBBFAC,,, | Performed by: OBSTETRICS & GYNECOLOGY

## 2024-08-22 PROCEDURE — 3075F SYST BP GE 130 - 139MM HG: CPT | Mod: CPTII,S$GLB,, | Performed by: OBSTETRICS & GYNECOLOGY

## 2024-08-22 PROCEDURE — 3080F DIAST BP >= 90 MM HG: CPT | Mod: CPTII,S$GLB,, | Performed by: OBSTETRICS & GYNECOLOGY

## 2024-08-22 PROCEDURE — 81514 NFCT DS BV&VAGINITIS DNA ALG: CPT | Performed by: OBSTETRICS & GYNECOLOGY

## 2024-08-22 PROCEDURE — 3008F BODY MASS INDEX DOCD: CPT | Mod: CPTII,S$GLB,, | Performed by: OBSTETRICS & GYNECOLOGY

## 2024-08-22 PROCEDURE — 99396 PREV VISIT EST AGE 40-64: CPT | Mod: S$GLB,,, | Performed by: OBSTETRICS & GYNECOLOGY

## 2024-08-22 PROCEDURE — 1159F MED LIST DOCD IN RCRD: CPT | Mod: CPTII,S$GLB,, | Performed by: OBSTETRICS & GYNECOLOGY

## 2024-08-22 RX ORDER — FEZOLINETANT 45 MG/1
TABLET, FILM COATED ORAL
COMMUNITY
Start: 2023-08-19

## 2024-08-22 NOTE — PROGRESS NOTES
Chief Complaint   Patient presents with    Well Woman    Vaginal Irritation       History of Present Illness: Kamini Dolan is a 59 y.o. female that presents today 8/22/2024 with Patient's last menstrual period was 06/13/2020.  for well gyn visit.  Vulvar Irritation since on the DOXY.   She reports trying diflucan with no relief.      Past Medical History:   Diagnosis Date    Acquired hypothyroidism 04/24/2016    Breast cancer in female 2023    DCIS    Lesion of adrenal gland     Sciatica neuralgia 04/24/2016    Seasonal allergic rhinitis 11/26/2012    Sinusitis, chronic 11/26/2012       Past Surgical History:   Procedure Laterality Date    LUMPECTOMY, BREAST Right 2023       Outpatient Medications Prior to Visit   Medication Sig Dispense Refill    AA/prot/lysine/methio/vit C/B6 (A/G PRO ORAL) Take by mouth.      azelastine (ASTELIN) 137 mcg (0.1 %) nasal spray 1 SPRAY (137 MCG TOTAL) BY NASAL ROUTE 2 (TWO) TIMES DAILY. 30 mL 5    calcium carb,gluc/mag ox,gluc (CALCIUM MAGNESIUM ORAL) Take by mouth.      celecoxib (CELEBREX) 100 MG capsule       COLLAGEN MISC by Misc.(Non-Drug; Combo Route) route.      enzymes,digestive (DIGESTIVE ENZYMES ORAL) Take by mouth.      famotidine (PEPCID) 20 MG tablet Take 20 mg by mouth 2 (two) times daily.      ferrous sulfate (FEOSOL) Tab tablet Take 1 tablet by mouth daily with breakfast.      fluticasone (FLONASE) 50 mcg/actuation nasal spray 2 SPRAYS IN EACH NOSTRIL EVERY DAY 1 Bottle 5    L.acid,rhamn/folate/lactoferr (CLAIRVEE ORAL)       L.acid/B.animalis,bifidum/FOS (PROBIOTIC COMPLEX ORAL) Take by mouth.      levocetirizine (XYZAL) 5 MG tablet Take 1 tablet (5 mg total) by mouth once daily. 30 tablet 11    montelukast (SINGULAIR) 10 mg tablet Take 1 tablet (10 mg total) by mouth once daily. 30 tablet 5    multivitamin capsule Take 1 capsule by mouth once daily.      omega-3 fatty acids/fish oil (FISH OIL-OMEGA-3 FATTY ACIDS) 300-1,000 mg capsule Take by mouth once  daily.      PEPPERMINT OIL MISC by Misc.(Non-Drug; Combo Route) route.      propylene glycoL (SYSTANE COMPLETE) 0.6 % Drop       tamoxifen (NOLVADEX) 10 MG Tab Take 5 mg by mouth.      tretinoin (RETIN-A) 0.01 % gel       UNABLE TO FIND medication name: LivGall Cleanse      UNABLE TO FIND medication name: Viviscal      VEOZAH 45 mg Tab       vitamin D (VITAMIN D3) 1000 units Tab Take 1,000 Units by mouth once daily.      ACZONE 5 % topical gel       EPINEPHrine (EPIPEN) 0.3 mg/0.3 mL AtIn Inject into the muscle. (Patient not taking: Reported on 2024)      melatonin (MELATIN) Take by mouth every evening.      olopatadine (PATADAY TWICE DAILY RELIEF) 0.1 % ophthalmic solution       pollens extract (RELIZEN) 160 mg Tab       UNABLE TO FIND medication name: Stress support      UNITHROID 75 mcg tablet Take 75 mcg by mouth.       No facility-administered medications prior to visit.       Review of patient's allergies indicates:   Allergen Reactions    Doxycycline Nausea Only    Morphine Nausea And Vomiting    Penicillins        Family History   Problem Relation Name Age of Onset    Diabetes Mother      Breast cancer Mother  61       Social History     Socioeconomic History    Marital status:    Tobacco Use    Smoking status: Former     Current packs/day: 0.00     Types: Cigarettes     Quit date: 1987     Years since quittin.7   Substance and Sexual Activity    Alcohol use: Yes     Alcohol/week: 0.0 standard drinks of alcohol       OB History    Para Term  AB Living   2 1 1   1     SAB IAB Ectopic Multiple Live Births       1   1      # Outcome Date GA Lbr Adolph/2nd Weight Sex Type Anes PTL Lv   2 Ectopic            1 Term      Vag-Spont         Obstetric Comments   MTX        Review of Symptoms:  GENERAL: Denies weight gain or weight loss. Feeling well overall.   SKIN: Denies rash or lesions.   HEAD: Denies head injury or headache.   NODES: Denies enlarged lymph nodes.   CHEST: Denies  "chest pain or shortness of breath.   CARDIOVASCULAR: Denies palpitations or left sided chest pain.   ABDOMEN: No abdominal pain, constipation, diarrhea, nausea, vomiting or rectal bleeding.   URINARY: No frequency, dysuria, hematuria, or burning on urination.  HEMATOLOGIC: No easy bruisability or excessive bleeding.   MUSCULOSKELETAL: Denies joint pain or swelling.     BP (!) 130/90   Ht 5' 2" (1.575 m)   Wt 62.9 kg (138 lb 10.7 oz)   LMP 06/13/2020   Physical Exam:  APPEARANCE: Well nourished, well developed, in no acute distress.  SKIN: Normal skin turgor, no lesions.  NECK: Neck symmetric without masses   RESPIRATORY: Normal respiratory effort with no retractions or use of accessory muscles  CARDIOVASCULAR: Peripheral vascular system with no swelling no varicosities and palpation of pulses normal  LYMPHATIC: No enlargements of the lymph nodes noted in the neck, axillae, or groin  ABDOMEN: Soft. No tenderness or masses. No hepatosplenomegaly. No hernias.  BREASTS: Symmetrical, no skin changes or visible lesions. No palpable masses, nipple discharge or adenopathy bilaterally.  PELVIC: Normal external female genitalia without lesions. Normal hair distribution. Adequate perineal body, normal urethral meatus. Urethra with no masses.  Bladder nontender. Vagina atrophy with thinning ++  Cervix pink and without lesions. No significant cystocele or rectocele. Bimanual exam showed uterus normal size, shape, position, mobile and nontender. Adnexa without masses or tenderness. Urethra and bladder normal.   EXTREMITIES: No clubbing cyanosis or edema.    ASSESSMENT/PLAN:  Gynecologic exam normal    Prophylactic use of tamoxifen    Ductal carcinoma in situ (DCIS) of right breast    Chronic vulvitis  -     Vaginosis Screen by DNA Probe; Future; Expected date: 08/22/2024          Patient was counseled today on Pelvic exams and Pap Smear guidelines.   We discussed STD screening if at high risk for a STD.  We discussed " recommendation for breast cancer screening with mammogram every other year after the age of 40 and annually after the age of 50.    We discussed colon cancer screening when indicated.   Osteoporosis screening discussed when indicated.   She was advised to see her primary care physician for all other health maintenance.     FOLLOW-UP with me for next routine visit.

## 2024-08-23 LAB
BACTERIAL VAGINOSIS DNA: NEGATIVE
CANDIDA GLABRATA DNA: NEGATIVE
CANDIDA KRUSEI DNA: NEGATIVE
CANDIDA RRNA VAG QL PROBE: NEGATIVE
T VAGINALIS RRNA GENITAL QL PROBE: NEGATIVE

## 2024-11-18 ENCOUNTER — OFFICE VISIT (OUTPATIENT)
Dept: PODIATRY | Facility: CLINIC | Age: 59
End: 2024-11-18
Payer: COMMERCIAL

## 2024-11-18 DIAGNOSIS — M79.672 FOOT PAIN, LEFT: Primary | ICD-10-CM

## 2024-11-18 DIAGNOSIS — M72.2 PLANTAR FIBROMATOSIS: ICD-10-CM

## 2024-11-18 PROCEDURE — 99213 OFFICE O/P EST LOW 20 MIN: CPT | Mod: S$GLB,,, | Performed by: PODIATRIST

## 2024-11-18 PROCEDURE — 99999 PR PBB SHADOW E&M-EST. PATIENT-LVL III: CPT | Mod: PBBFAC,,, | Performed by: PODIATRIST

## 2024-11-18 PROCEDURE — 1159F MED LIST DOCD IN RCRD: CPT | Mod: CPTII,S$GLB,, | Performed by: PODIATRIST

## 2024-11-20 NOTE — PROGRESS NOTES
Subjective:      Patient ID: Kamini Dolan is a 59 y.o. female.    Chief Complaint: Foot Pain  Patient presents to clinic with the chief complaint of a painful nodule that developed slightly distal to the Lt. Plantar central heel 1-2 months ago.  States the mass was initially a source of pain, however, symptoms have been improving over time.  Notes sharp pain with directly applied pressure.  Rates pain as a 0/10.  Symptoms are alleviated with rest.  She was unsure of both the etiology and how to treat this issue.  Denies sustaining trauma to the site.  Denies any additional pedal complaints.      Past Medical History:   Diagnosis Date    Acquired hypothyroidism 2016    Breast cancer in female     DCIS    Lesion of adrenal gland     Sciatica neuralgia 2016    Seasonal allergic rhinitis 2012    Sinusitis, chronic 2012       Past Surgical History:   Procedure Laterality Date    LUMPECTOMY, BREAST Right        Family History   Problem Relation Name Age of Onset    Diabetes Mother      Breast cancer Mother  61       Social History     Socioeconomic History    Marital status:    Tobacco Use    Smoking status: Former     Current packs/day: 0.00     Types: Cigarettes     Quit date: 1987     Years since quittin.0   Substance and Sexual Activity    Alcohol use: Yes     Alcohol/week: 0.0 standard drinks of alcohol       Current Outpatient Medications   Medication Sig Dispense Refill    AA/prot/lysine/methio/vit C/B6 (A/G PRO ORAL) Take by mouth.      azelastine (ASTELIN) 137 mcg (0.1 %) nasal spray 1 SPRAY (137 MCG TOTAL) BY NASAL ROUTE 2 (TWO) TIMES DAILY. 30 mL 5    calcium carb,gluc/mag ox,gluc (CALCIUM MAGNESIUM ORAL) Take by mouth.      celecoxib (CELEBREX) 100 MG capsule       COLLAGEN MISC by Misc.(Non-Drug; Combo Route) route.      enzymes,digestive (DIGESTIVE ENZYMES ORAL) Take by mouth.      EPINEPHrine (EPIPEN) 0.3 mg/0.3 mL AtIn Inject into the muscle.       famotidine (PEPCID) 20 MG tablet Take 20 mg by mouth 2 (two) times daily.      ferrous sulfate (FEOSOL) Tab tablet Take 1 tablet by mouth daily with breakfast.      fluticasone (FLONASE) 50 mcg/actuation nasal spray 2 SPRAYS IN EACH NOSTRIL EVERY DAY 1 Bottle 5    L.acid/B.animalis,bifidum/FOS (PROBIOTIC COMPLEX ORAL) Take by mouth.      levocetirizine (XYZAL) 5 MG tablet Take 1 tablet (5 mg total) by mouth once daily. 30 tablet 11    multivitamin capsule Take 1 capsule by mouth once daily.      omega-3 fatty acids/fish oil (FISH OIL-OMEGA-3 FATTY ACIDS) 300-1,000 mg capsule Take by mouth once daily.      PEPPERMINT OIL MISC by Misc.(Non-Drug; Combo Route) route.      propylene glycoL (SYSTANE COMPLETE) 0.6 % Drop       tamoxifen (NOLVADEX) 10 MG Tab Take 5 mg by mouth.      tretinoin (RETIN-A) 0.01 % gel       UNABLE TO FIND medication name: LivGall Cleanse      UNABLE TO FIND medication name: Viviscal      VEOZAH 45 mg Tab       vitamin D (VITAMIN D3) 1000 units Tab Take 1,000 Units by mouth once daily.      ACZONE 5 % topical gel       L.acid,rhamn/folate/lactoferr (CLAIRVEE ORAL)       melatonin (MELATIN) Take by mouth every evening.      montelukast (SINGULAIR) 10 mg tablet Take 1 tablet (10 mg total) by mouth once daily. 30 tablet 5    olopatadine (PATADAY TWICE DAILY RELIEF) 0.1 % ophthalmic solution       pollens extract (RELIZEN) 160 mg Tab       UNABLE TO FIND medication name: Stress support      UNITHROID 75 mcg tablet Take 75 mcg by mouth.       No current facility-administered medications for this visit.       Review of patient's allergies indicates:   Allergen Reactions    Doxycycline Nausea Only    Morphine Nausea And Vomiting    Penicillins     Diazepam Nausea Only      Review of Systems   Constitutional: Negative for chills and fever.   Cardiovascular:  Negative for claudication and leg swelling.   Skin:  Negative for color change and nail changes.   Musculoskeletal:  Positive for myalgias. Negative  for joint pain, joint swelling, muscle cramps and muscle weakness.   Gastrointestinal:  Negative for nausea and vomiting.   Neurological:  Negative for numbness and paresthesias.   Psychiatric/Behavioral:  Negative for altered mental status.            Objective:      Physical Exam  Constitutional:       Appearance: Normal appearance. She is not ill-appearing.   Cardiovascular:      Pulses:           Dorsalis pedis pulses are 2+ on the right side and 2+ on the left side.        Posterior tibial pulses are 2+ on the right side and 2+ on the left side.      Comments: CFT is < 3 seconds bilateral.  Pedal hair growth is present bilateral.  No lower extremity edema noted bilateral.  Toes are warm to touch bilateral.    Musculoskeletal:         General: Tenderness present. No swelling or signs of injury.      Right lower leg: No edema.      Left lower leg: No edema.      Comments: Muscle strength 5/5 in all muscle groups bilateral.  No tenderness nor crepitation with ROM of foot/ankle joints bilateral.  Minimal pain with palpation to the plantar fibroma of the Lt. Foot.     Skin:     General: Skin is warm and dry.      Capillary Refill: Capillary refill takes 2 to 3 seconds.      Findings: No bruising, ecchymosis, erythema, signs of injury, laceration, lesion, petechiae, rash or wound.      Comments: Pedal skin has normal turgor, temperature, and texture bilateral.  Toenails x 10 appear normotrophic. Palpable nodule invested in the plantar central portion of the Lt. Plantar fascia.  Mass is firm, non mobile, normal in coloration and measures 0.2 x 0.2cm.        Neurological:      General: No focal deficit present.      Mental Status: She is alert.      Sensory: No sensory deficit.      Motor: No weakness or atrophy.      Comments: Light touch is intact bilateral.               Assessment:       Encounter Diagnoses   Name Primary?    Foot pain, left Yes    Plantar fibromatosis          Plan:       Kamini was seen today  for foot pain.    Diagnoses and all orders for this visit:    Foot pain, left    Plantar fibromatosis      I counseled the patient on her conditions, their implications and medical management.    Based on today's exam, the patient has a plantar fibroma invested in the proximal central most band of fascia of the Lt. Foot.      Being that the site is minimally symptomatic, no further intervention is warranted.  However, I suggest she offload the site with foam padding with an aperture for her upcoming trip, as a significant amount of time will be spent ambulatory.      Should this mass worsen in terms of size and pain, we will consider either an intralesional steroid injection or BID application of topical verapamil.  Patient is amenable to said plan.    RTC prn.    Phil Gaspar DPM

## 2025-08-26 ENCOUNTER — OFFICE VISIT (OUTPATIENT)
Dept: OBSTETRICS AND GYNECOLOGY | Facility: CLINIC | Age: 60
End: 2025-08-26
Payer: COMMERCIAL

## 2025-08-26 VITALS — WEIGHT: 139.31 LBS | BODY MASS INDEX: 25.64 KG/M2 | HEIGHT: 62 IN

## 2025-08-26 DIAGNOSIS — Z79.810 PROPHYLACTIC USE OF TAMOXIFEN: ICD-10-CM

## 2025-08-26 DIAGNOSIS — D05.11 DUCTAL CARCINOMA IN SITU (DCIS) OF RIGHT BREAST: ICD-10-CM

## 2025-08-26 DIAGNOSIS — Z79.810 ENCOUNTER FOR MONITORING TAMOXIFEN THERAPY: ICD-10-CM

## 2025-08-26 DIAGNOSIS — N76.3 CHRONIC VULVITIS: ICD-10-CM

## 2025-08-26 DIAGNOSIS — N95.2 VAGINAL ATROPHY: ICD-10-CM

## 2025-08-26 DIAGNOSIS — N94.19 DYSPAREUNIA DUE TO MEDICAL CONDITION IN FEMALE: ICD-10-CM

## 2025-08-26 DIAGNOSIS — Z51.81 ENCOUNTER FOR MONITORING TAMOXIFEN THERAPY: ICD-10-CM

## 2025-08-26 DIAGNOSIS — Z01.419 GYNECOLOGIC EXAM NORMAL: Primary | ICD-10-CM

## 2025-08-26 PROCEDURE — 88175 CYTOPATH C/V AUTO FLUID REDO: CPT | Mod: TC | Performed by: OBSTETRICS & GYNECOLOGY

## 2025-08-26 PROCEDURE — 1160F RVW MEDS BY RX/DR IN RCRD: CPT | Mod: CPTII,S$GLB,, | Performed by: OBSTETRICS & GYNECOLOGY

## 2025-08-26 PROCEDURE — 87624 HPV HI-RISK TYP POOLED RSLT: CPT | Performed by: OBSTETRICS & GYNECOLOGY

## 2025-08-26 PROCEDURE — 99396 PREV VISIT EST AGE 40-64: CPT | Mod: S$GLB,,, | Performed by: OBSTETRICS & GYNECOLOGY

## 2025-08-26 PROCEDURE — 99999 PR PBB SHADOW E&M-EST. PATIENT-LVL III: CPT | Mod: PBBFAC,,, | Performed by: OBSTETRICS & GYNECOLOGY

## 2025-08-26 PROCEDURE — 1159F MED LIST DOCD IN RCRD: CPT | Mod: CPTII,S$GLB,, | Performed by: OBSTETRICS & GYNECOLOGY

## 2025-08-26 PROCEDURE — 3008F BODY MASS INDEX DOCD: CPT | Mod: CPTII,S$GLB,, | Performed by: OBSTETRICS & GYNECOLOGY

## 2025-08-26 RX ORDER — OMEPRAZOLE 20 MG/1
20 TABLET, DELAYED RELEASE ORAL DAILY
COMMUNITY

## 2025-08-26 RX ORDER — LEVOTHYROXINE SODIUM 88 UG/1
88 TABLET ORAL
COMMUNITY

## 2025-08-26 RX ORDER — SUCRALFATE 1 G/1
TABLET ORAL
COMMUNITY

## 2025-08-29 LAB
INSULIN SERPL-ACNC: NORMAL U[IU]/ML
LAB AP BETHESDA CATEGORY: NORMAL
LAB AP CLINICAL FINDINGS: NORMAL
LAB AP CONTRACEPTIVES: NORMAL
LAB AP LMP DATE: NORMAL
LAB AP OCHS PAP SPECIMEN ADEQUACY: NORMAL
LAB AP OHS PAP INTERPRETATION: NORMAL
LAB AP PAP DISCLAIMER COMMENTS: NORMAL
LAB AP PAP ESTROGEN REPLACEMENT THERAPY: NORMAL
LAB AP PAP PMP: NORMAL
LAB AP PAP PREVIOUS BX: NORMAL
LAB AP PAP PRIOR TREATMENT: NORMAL
LAB AP PERFORMING LOCATION(S): NORMAL